# Patient Record
Sex: FEMALE | Race: WHITE | URBAN - METROPOLITAN AREA
[De-identification: names, ages, dates, MRNs, and addresses within clinical notes are randomized per-mention and may not be internally consistent; named-entity substitution may affect disease eponyms.]

---

## 2017-05-23 ENCOUNTER — IMPORTED ENCOUNTER (OUTPATIENT)
Dept: URBAN - METROPOLITAN AREA CLINIC 1 | Facility: CLINIC | Age: 77
End: 2017-05-23

## 2017-05-23 PROBLEM — Z96.1: Noted: 2017-05-23

## 2017-05-23 PROBLEM — D31.32: Noted: 2017-05-23

## 2017-05-23 PROBLEM — H04.123: Noted: 2017-05-23

## 2017-05-23 PROBLEM — H16.143: Noted: 2017-05-23

## 2017-05-23 PROCEDURE — 92015 DETERMINE REFRACTIVE STATE: CPT

## 2017-05-23 PROCEDURE — 92014 COMPRE OPH EXAM EST PT 1/>: CPT

## 2017-05-23 NOTE — PATIENT DISCUSSION
1.  Choroidal Nevus OS: Appears Benign and stable. Observe for changes. 2. PASCUAL w/ PEK OU- Increase ATs to QID OU routinely. 3.  Pseudophakia OU - (Standard OU) Letter to PCP Return for an appointment in 1 yr 27 with Dr. Stanley Reina.

## 2018-05-24 ENCOUNTER — IMPORTED ENCOUNTER (OUTPATIENT)
Dept: URBAN - METROPOLITAN AREA CLINIC 1 | Facility: CLINIC | Age: 78
End: 2018-05-24

## 2018-05-24 PROBLEM — H16.143: Noted: 2018-05-24

## 2018-05-24 PROBLEM — D31.32: Noted: 2018-05-24

## 2018-05-24 PROBLEM — H04.123: Noted: 2018-05-24

## 2018-05-24 PROBLEM — H40.051: Noted: 2018-05-24

## 2018-05-24 PROCEDURE — 92014 COMPRE OPH EXAM EST PT 1/>: CPT

## 2018-05-24 NOTE — PATIENT DISCUSSION
1.  Choroidal Nevus OS: Appears Benign and stable. Observe for changes. 2. PASCUAL w/ PEK OU- Pt symptomatic despite tear use: Inserted Small Plugs RLL LLL today: Silicone Plug RLL and LLL--  Risks benefits and alternatives to placing plug was discussed with patient. Patient understands and would like to proceed. Consent was signed. Post op instructions were discussed/given to patient and patient was advised to call our office if any problems arose. - Cont ATs QID OU routinely. 3.  Pseudophakia OU - (Standard OU) 4. OHTN OD (CD 0.4) IOP borderline OD today. Will observe at this time. Letter to PCP Return for an appointment in 1 year 27 with Dr. Akash Denny.

## 2018-05-24 NOTE — PATIENT DISCUSSION
Silicone Plug RLL and LLL:  Risks benefits and alternatives to placing plug was discussed with patient. Patient understands and would like to proceed. Consent was signed. Post op instructions were discussed/given to patient and patient was advised to call our office if any problems arose.

## 2019-05-28 ENCOUNTER — IMPORTED ENCOUNTER (OUTPATIENT)
Dept: URBAN - METROPOLITAN AREA CLINIC 1 | Facility: CLINIC | Age: 79
End: 2019-05-28

## 2019-05-28 PROBLEM — Z96.1: Noted: 2019-05-28

## 2019-05-28 PROBLEM — E11.9: Noted: 2019-05-28

## 2019-05-28 PROBLEM — H40.053: Noted: 2019-05-28

## 2019-05-28 PROBLEM — D31.32: Noted: 2019-05-28

## 2019-05-28 PROBLEM — Z79.84: Noted: 2019-05-28

## 2019-05-28 PROBLEM — H16.143: Noted: 2019-05-28

## 2019-05-28 PROBLEM — H04.123: Noted: 2019-05-28

## 2019-05-28 PROCEDURE — 92014 COMPRE OPH EXAM EST PT 1/>: CPT

## 2019-05-28 NOTE — PATIENT DISCUSSION
1.  DM Type II without sign of diabetic retinopathy and no blot heme on dilated retinal examination today OU No Macular Edema:  Discussed the pathophysiology of diabetes and its effect on the eye and risk of blindness. Stressed the importance of strong glucose control. Advised of importance of at least yearly dilated examinations but to contact us immediately for any problems or concerns. 2. Choroidal Nevus OS: Appears Benign and stable. Observe for changes. 3. PASCUAL w/ PEK OU - (LL plugs in place OU) Recommend ATs TID OU routinely 4. Pseudophakia OU - (Standard OU) 5. OHTN (CD 0.40/0.45) - IOP stable. Observe for changes/progression. Patient defers the refraction at today's visitReturn for an appointment in 1 year 27 with Dr. Inés Escudero.

## 2020-09-10 ENCOUNTER — IMPORTED ENCOUNTER (OUTPATIENT)
Dept: URBAN - METROPOLITAN AREA CLINIC 1 | Facility: CLINIC | Age: 80
End: 2020-09-10

## 2020-09-10 PROBLEM — D31.32: Noted: 2020-09-10

## 2020-09-10 PROBLEM — H40.053: Noted: 2020-09-10

## 2020-09-10 PROBLEM — Z79.84: Noted: 2020-09-10

## 2020-09-10 PROBLEM — E11.9: Noted: 2020-09-10

## 2020-09-10 PROBLEM — H04.123: Noted: 2020-09-10

## 2020-09-10 PROBLEM — Z96.1: Noted: 2020-09-10

## 2020-09-10 PROBLEM — H16.143: Noted: 2020-09-10

## 2020-09-10 PROCEDURE — 92014 COMPRE OPH EXAM EST PT 1/>: CPT

## 2020-09-10 NOTE — PATIENT DISCUSSION
1.  DM Type II (Oral Medication) without sign of diabetic retinopathy and no blot heme on dilated retinal examination today OU No Macular Edema:  Discussed the pathophysiology of diabetes and its effect on the eye and risk of blindness. Stressed the importance of strong glucose control. Advised of importance of at least yearly dilated examinations but to contact us immediately for any problems or concerns. 2. Choroidal Nevus OS: Appears Benign and stable. Observe for changes. 3. PASCUAL w/ PEK OU- (LL plugs in place OU) Recommend ATs TID OU routinely 4. Pseudophakia OU - (Standard OU) 5. OHTN (CD 0.40/0.55) - IOP stable. Observe for changes/progression. Patient deferred Manifest Rx today. Return for an appointment in 1 year 27 with Dr. Claudeen Cobble.

## 2021-09-09 ENCOUNTER — IMPORTED ENCOUNTER (OUTPATIENT)
Dept: URBAN - METROPOLITAN AREA CLINIC 1 | Facility: CLINIC | Age: 81
End: 2021-09-09

## 2021-09-09 PROBLEM — E11.9: Noted: 2021-09-09

## 2021-09-09 PROBLEM — H40.053: Noted: 2021-09-09

## 2021-09-09 PROBLEM — H16.143: Noted: 2021-09-09

## 2021-09-09 PROBLEM — D31.32: Noted: 2021-09-09

## 2021-09-09 PROBLEM — H04.123: Noted: 2021-09-09

## 2021-09-09 PROBLEM — Z96.1: Noted: 2021-09-09

## 2021-09-09 PROBLEM — Z79.84: Noted: 2021-09-09

## 2021-09-09 PROCEDURE — 99214 OFFICE O/P EST MOD 30 MIN: CPT

## 2021-09-09 NOTE — PATIENT DISCUSSION
1.  DM Type II (Oral Medication) without sign of diabetic retinopathy and no blot heme on dilated retinal examination today OU No Macular Edema:  Discussed the pathophysiology of diabetes and its effect on the eye and risk of blindness. Stressed the importance of strong glucose control. Advised of importance of at least yearly dilated examinations but to contact us immediately for any problems or concerns. 2. Choroidal Nevus OS - Stable/Observe for changes. 3. PASCUAL w/ PEK OU - (LL plugs in place OU) Recommend ATs TID OU routinely. 4. Pseudophakia OU - (Standard OU) 5. OHTN (CD 0.40/0.55) - IOP stable. Observe for changes/progression. Letter to PCP. Return for an appointment in 1 year 27 with Dr. Inés Escudero.

## 2022-04-02 ASSESSMENT — VISUAL ACUITY
OS_CC: J1
OD_CC: J1
OS_CC: J1
OD_CC: J1
OD_CC: J1
OS_SC: 20/25
OS_SC: 20/20
OD_SC: 20/20-2
OS_SC: 20/20-2
OS_SC: 20/20
OD_SC: 20/20
OS_CC: J1
OS_SC: 20/20
OS_CC: J1
OD_CC: J1

## 2022-04-02 ASSESSMENT — TONOMETRY
OD_IOP_MMHG: 21
OD_IOP_MMHG: 21
OS_IOP_MMHG: 20
OD_IOP_MMHG: 22
OS_IOP_MMHG: 21
OS_IOP_MMHG: 21
OD_IOP_MMHG: 18
OS_IOP_MMHG: 18
OS_IOP_MMHG: 20
OD_IOP_MMHG: 22

## 2023-09-25 ENCOUNTER — COMPREHENSIVE EXAM (OUTPATIENT)
Dept: URBAN - METROPOLITAN AREA CLINIC 1 | Facility: CLINIC | Age: 83
End: 2023-09-25

## 2023-09-25 DIAGNOSIS — H04.123: ICD-10-CM

## 2023-09-25 DIAGNOSIS — H16.143: ICD-10-CM

## 2023-09-25 DIAGNOSIS — E11.9: ICD-10-CM

## 2023-09-25 DIAGNOSIS — D31.32: ICD-10-CM

## 2023-09-25 PROCEDURE — 99214 OFFICE O/P EST MOD 30 MIN: CPT

## 2023-09-25 ASSESSMENT — VISUAL ACUITY
OD_PH: 20/30
OS_SC: 20/20
OD_SC: 20/30-2
OU_CC: J1
OD_BAT: 20/30
OS_BAT: 20/30

## 2023-09-25 ASSESSMENT — TONOMETRY
OS_IOP_MMHG: 17
OD_IOP_MMHG: 22

## 2024-10-28 ENCOUNTER — COMPREHENSIVE EXAM (OUTPATIENT)
Dept: URBAN - METROPOLITAN AREA CLINIC 1 | Facility: CLINIC | Age: 84
End: 2024-10-28

## 2024-10-28 DIAGNOSIS — H04.123: ICD-10-CM

## 2024-10-28 DIAGNOSIS — H16.143: ICD-10-CM

## 2024-10-28 DIAGNOSIS — D31.32: ICD-10-CM

## 2024-10-28 DIAGNOSIS — Z96.1: ICD-10-CM

## 2024-10-28 DIAGNOSIS — H40.053: ICD-10-CM

## 2024-10-28 DIAGNOSIS — E11.9: ICD-10-CM

## 2024-10-28 PROCEDURE — 99214 OFFICE O/P EST MOD 30 MIN: CPT
